# Patient Record
(demographics unavailable — no encounter records)

---

## 2019-04-21 NOTE — EDM.PDOC
ED HPI GENERAL MEDICAL PROBLEM





- General


Chief Complaint: Respiratory Problem


Stated Complaint: ASTHMA,CHEST PAIN,SOB


Time Seen by Provider: 04/21/19 17:46


Source of Information: Reports: Patient


History Limitations: Reports: No Limitations





- History of Present Illness


INITIAL COMMENTS - FREE TEXT/NARRATIVE: 





The patient presents with chest pain and shortness of breath.  She also has a 

cough.  She has no fever but she has chills.  She has a history of asthma and 

she was at the walk in clinic today and put on prednisone.  She has not gotten 

any better.  She has no abdominal pain, nausea or vomiting.  She has some 

swelling in her legs at times but no history of DVT or PE.


Onset: Gradual


Duration: Day(s):


Location: Reports: Chest


Quality: Reports: Pressure, Sharp (and )


Severity: Moderate


Improves with: Reports: None


Worsens with: Reports: None


Associated Symptoms: Reports: Chest Pain, Cough, Fever/Chills, Shortness of 

Breath.  Denies: Headaches, Nausea/Vomiting


  ** Chest


Pain Score (Numeric/FACES): 6





- Related Data


 Allergies











Allergy/AdvReac Type Severity Reaction Status Date / Time


 


grass pollen Allergy  Cannot Verified 04/21/19 17:58





   Remember  


 


cow milk Allergy  Hives Uncoded 04/21/19 17:58


 


essential oils Allergy  Anaphylactic Uncoded 04/21/19 17:58





   Shock  


 


tylenol with codeine Allergy  Vomiting Uncoded 04/21/19 17:58











Home Meds: 


 Home Meds





Albuterol [Proventil Neb Soln] 2.5 mg NEB ASDIRECTED 04/21/19 [History]


Dicyclomine [Bentyl] 10 mg PO BID 04/21/19 [History]


Doxycycline [Doxycycline Hyclate] 100 mg PO DAILY 04/21/19 [History]


Eluxadoline [Viberzi] 100 mg PO DAILY 04/21/19 [History]


Epi-Pen.  04/21/19 [History]


Fluconazole [Diflucan] 100 mg PO ASDIRECTED 04/21/19 [History]


LORazepam 0.5 mg PO BID PRN 04/21/19 [History]


Levothyroxine Sodium [Synthroid] 25 mcg PO DAILY 04/21/19 [History]


Lisdexamfetamine Dimesylate [Vyvanse] 20 mg PO DAILY 04/21/19 [History]


Metoprolol Succinate 50 mg PO DAILY 04/21/19 [History]


Mirtazapine 45 mg PO BEDTIME 04/21/19 [History]


Mirtazapine [Remeron] 15 mg PO DAILY 04/21/19 [History]


Pantoprazole Sodium [Protonix] 40 mg PO DAILY 04/21/19 [History]


QUEtiapine [SEROquel] 300 mg PO BEDTIME 04/21/19 [History]


SUMAtriptan [Imitrex] 100 mg PO ASDIRECTED PRN 04/21/19 [History]


Temazepam 30 mg PO BEDTIME 04/21/19 [History]


Topiramate [Topamax] 100 mg PO ASDIRECTED 04/21/19 [History]


buPROPion [buPROPion XL] 300 mg PO DAILY 04/21/19 [History]


lamoTRIgine [Lamotrigine] 50 mg PO BID 04/21/19 [History]


metFORMIN [Glucophage] 500 mg PO DAILY 04/21/19 [History]


predniSONE 40 mg PO DAILY 04/21/19 [History]











Past Medical History


Respiratory History: Reports: Asthma


Gastrointestinal History: Reports: Irritable Bowel Syndrome


OB/GYN History: Reports: Endometriosis


Psychiatric History: Reports: Depression





Social & Family History





- Tobacco Use


Smoking Status *Q: Current Some Day Smoker


Years of Tobacco use: 1


Packs/Tins Daily: 0.1





- Recreational Drug Use


Recreational Drug Use: No





ED ROS GENERAL





- Review of Systems


Review Of Systems: See Below


Constitutional: Reports: Chills.  Denies: Fever


HEENT: Reports: No Symptoms


Respiratory: Reports: Shortness of Breath


Cardiovascular: Reports: Chest Pain


Endocrine: Reports: No Symptoms


GI/Abdominal: Reports: No Symptoms


: Reports: No Symptoms


Musculoskeletal: Reports: No Symptoms


Skin: Reports: No Symptoms


Neurological: Reports: No Symptoms





ED EXAM, GENERAL





- Physical Exam


Exam: See Below


Exam Limited By: No Limitations


General Appearance: Alert, No Apparent Distress


Ears: Normal External Exam


Nose: Normal Inspection


Head: Atraumatic, Normocephalic


Neck: Normal Inspection


Respiratory/Chest: No Respiratory Distress, Lungs Clear, Normal Breath Sounds


Cardiovascular: Regular Rate, Rhythm, No Edema, No Murmur


GI/Abdominal: Soft, Non-Tender, No Organomegaly, No Mass


Back Exam: Normal Inspection


Extremities: Normal Inspection





EKG INTERPRETATION


EKG Date: 04/21/19


Time: 16:13


Rhythm: NSR


Rate (Beats/Min): 83


Axis: Normal


P-Wave: Present


QRS: Normal


ST-T: Normal


QT: Normal





Course





- Vital Signs


Last Recorded V/S: 


 Last Vital Signs











Temp  97.3 F   04/21/19 17:43


 


Pulse  83   04/21/19 17:43


 


Resp  18   04/21/19 17:43


 


BP  112/82   04/21/19 17:43


 


Pulse Ox  97   04/21/19 17:43














- Orders/Labs/Meds


Orders: 


 Active Orders 24 hr











 Category Date Time Status


 


 Cardiac Monitoring [RC] .AS DIRECTED Care  04/21/19 17:54 Active


 


 EKG Documentation Completion [RC] STAT Care  04/21/19 17:54 Active


 


 Chest 2V [CR] Stat Exams  04/21/19 17:54 Taken


 


 traMADol [Ultram] Med  04/21/19 19:40 Once





 100 mg PO ONETIME ONE   











Labs: 


 Laboratory Tests











  04/21/19 04/21/19 04/21/19 Range/Units





  18:05 18:05 18:05 


 


WBC  8.25    (3.98-10.04)  K/mm3


 


RBC  4.73    (3.98-5.22)  M/mm3


 


Hgb  14.9    (11.2-15.7)  gm/L


 


Hct  43.4    (34.1-44.9)  %


 


MCV  91.8    (79.4-94.8)  fl


 


MCH  31.5    (25.6-32.2)  pg


 


MCHC  34.3    (32.2-35.5)  g/dl


 


RDW Std Deviation  41.4    (36.4-46.3)  fL


 


Plt Count  283    (182-369)  K/mm3


 


MPV  9.3 L    (9.4-12.3)  fl


 


Neut % (Auto)  84.4 H    (34.0-71.1)  %


 


Lymph % (Auto)  12.8 L    (19.3-51.7)  %


 


Mono % (Auto)  2.1 L    (4.7-12.5)  %


 


Eos % (Auto)  0.4 L    (0.7-5.8)  


 


Baso % (Auto)  0.2    (0.1-1.2)  %


 


Neut # (Auto)  6.96 H    (1.56-6.13)  K/mm3


 


Lymph # (Auto)  1.06 L    (1.18-3.74)  K/mm3


 


Mono # (Auto)  0.17 L    (0.24-0.36)  K/mm3


 


Eos # (Auto)  0.03 L    (0.04-0.36)  K/mm3


 


Baso # (Auto)  0.02    (0.01-0.08)  K/mm3


 


D-Dimer, Quantitative   0.30   (0.19-0.50)  mg/L


 


Sodium    141  (136-145)  mEq/L


 


Potassium    4.0  (3.5-5.1)  mEq/L


 


Chloride    110 H  ()  mEq/L


 


Carbon Dioxide    19 L  (21-32)  mEq/L


 


Anion Gap    16.0 H  (5-15)  


 


BUN    13  (7-18)  mg/dL


 


Creatinine    1.0  (0.55-1.02)  mg/dL


 


Est Cr Clr Drug Dosing    63.20  mL/min


 


Estimated GFR (MDRD)    > 60  (>60)  mL/min


 


BUN/Creatinine Ratio    13.0 L  (14-18)  


 


Glucose    165 H  ()  mg/dL


 


Calcium    8.9  (8.5-10.1)  mg/dL


 


Total Bilirubin    0.3  (0.2-1.0)  mg/dL


 


AST    23  (15-37)  U/L


 


ALT    33  (14-59)  U/L


 


Alkaline Phosphatase    69  ()  U/L


 


Troponin I    < 0.017  (0.00-0.056)  ng/mL


 


Total Protein    7.3  (6.4-8.2)  g/dl


 


Albumin    3.5  (3.4-5.0)  g/dl


 


Globulin    3.8  gm/dL


 


Albumin/Globulin Ratio    0.9 L  (1-2)  














- Re-Assessments/Exams


Free Text/Narrative Re-Assessment/Exam: 





04/21/19 19:32


I ordered an EKG, CXR and labs.  He EKG shows a NSR with no acute changes.  Her 

CXR looks good.  Her CBC looks good.  Her D-dimer was normal and her troponin 

was negative.  Her anion gap was slightly elevated at 16.  Her glucose was 

elevated at 165.


04/21/19 19:41


I will give her an ultram for her pain and discharge her home.





Departure





- Departure


Time of Disposition: 19:45


Disposition: Home, Self-Care 01


Condition: Good


Clinical Impression: 


 Chest wall pain





Asthma exacerbation


Qualifiers:


 Asthma severity: mild Asthma persistence: intermittent Qualified Code(s): 

J45.21 - Mild intermittent asthma with (acute) exacerbation








- Discharge Information


*PRESCRIPTION DRUG MONITORING PROGRAM REVIEWED*: Not Applicable


*COPY OF PRESCRIPTION DRUG MONITORING REPORT IN PATIENT [facility]: Not Applicable


Referrals: 


PCP,None [Primary Care Provider] - 


Forms:  ED Department Discharge


Additional Instructions: 


Take the prednisone as prescribed and use your albuterol inhaler as needed.  

Take tylenol as needed for pain.  Follow up with your doctor in 1 week.  Please 

return if you are worse.





- My Orders


Last 24 Hours: 


My Active Orders





04/21/19 17:54


Cardiac Monitoring [RC] .AS DIRECTED 


EKG Documentation Completion [RC] STAT 


Chest 2V [CR] Stat 





04/21/19 19:40


traMADol [Ultram]   100 mg PO ONETIME ONE 














- Assessment/Plan


Last 24 Hours: 


My Active Orders





04/21/19 17:54


Cardiac Monitoring [RC] .AS DIRECTED 


EKG Documentation Completion [RC] STAT 


Chest 2V [CR] Stat 





04/21/19 19:40


traMADol [Ultram]   100 mg PO ONETIME ONE